# Patient Record
Sex: MALE | Race: OTHER
[De-identification: names, ages, dates, MRNs, and addresses within clinical notes are randomized per-mention and may not be internally consistent; named-entity substitution may affect disease eponyms.]

---

## 2022-11-21 ENCOUNTER — HOSPITAL ENCOUNTER (INPATIENT)
Dept: HOSPITAL 54 - ER | Age: 72
LOS: 6 days | Discharge: HOME HEALTH | DRG: 393 | End: 2022-11-27
Attending: NURSE PRACTITIONER | Admitting: NURSE PRACTITIONER
Payer: MEDICARE

## 2022-11-21 VITALS — WEIGHT: 108 LBS | BODY MASS INDEX: 14.63 KG/M2 | HEIGHT: 72 IN

## 2022-11-21 DIAGNOSIS — K29.70: ICD-10-CM
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
## 2022-11-21 DIAGNOSIS — Z20.822: ICD-10-CM

## 2022-11-21 DIAGNOSIS — Z85.038: ICD-10-CM
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
## 2022-11-21 DIAGNOSIS — Z93.3: ICD-10-CM

## 2022-11-21 DIAGNOSIS — R13.10: ICD-10-CM

## 2022-11-21 DIAGNOSIS — I10: ICD-10-CM

## 2022-11-21 DIAGNOSIS — K94.23: Primary | ICD-10-CM

## 2022-11-21 DIAGNOSIS — G89.29: ICD-10-CM

## 2022-11-21 DIAGNOSIS — L89.153: ICD-10-CM
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
## 2022-11-21 DIAGNOSIS — E87.1: ICD-10-CM

## 2022-11-21 DIAGNOSIS — I48.0: ICD-10-CM

## 2022-11-21 DIAGNOSIS — D63.8: ICD-10-CM

## 2022-11-21 LAB
BASOPHILS # BLD AUTO: 0 K/UL (ref 0–0.2)
BASOPHILS NFR BLD AUTO: 0.5 % (ref 0–2)
BUN SERPL-MCNC: 33 MG/DL (ref 7–18)
CALCIUM SERPL-MCNC: 8.3 MG/DL (ref 8.5–10.1)
CHLORIDE SERPL-SCNC: 101 MMOL/L (ref 98–107)
CO2 SERPL-SCNC: 32 MMOL/L (ref 21–32)
CREAT SERPL-MCNC: 0.9 MG/DL (ref 0.6–1.3)
EOSINOPHIL NFR BLD AUTO: 2.5 % (ref 0–6)
GLUCOSE SERPL-MCNC: 95 MG/DL (ref 74–106)
HCT VFR BLD AUTO: 31 % (ref 39–51)
HGB BLD-MCNC: 10.1 G/DL (ref 13.5–17.5)
LYMPHOCYTES NFR BLD AUTO: 0.8 K/UL (ref 0.8–4.8)
LYMPHOCYTES NFR BLD AUTO: 8.1 % (ref 20–44)
MCHC RBC AUTO-ENTMCNC: 33 G/DL (ref 31–36)
MCV RBC AUTO: 92 FL (ref 80–96)
MONOCYTES NFR BLD AUTO: 0.8 K/UL (ref 0.1–1.3)
MONOCYTES NFR BLD AUTO: 7.9 % (ref 2–12)
NEUTROPHILS # BLD AUTO: 7.8 K/UL (ref 1.8–8.9)
NEUTROPHILS NFR BLD AUTO: 81 % (ref 43–81)
PLATELET # BLD AUTO: 357 K/UL (ref 150–450)
POTASSIUM SERPL-SCNC: 3.9 MMOL/L (ref 3.5–5.1)
RBC # BLD AUTO: 3.35 MIL/UL (ref 4.5–6)
SODIUM SERPL-SCNC: 138 MMOL/L (ref 136–145)
WBC NRBC COR # BLD AUTO: 9.7 K/UL (ref 4.3–11)

## 2022-11-21 PROCEDURE — A9563 P32 NA PHOSPHATE: HCPCS

## 2022-11-21 PROCEDURE — C9803 HOPD COVID-19 SPEC COLLECT: HCPCS

## 2022-11-21 PROCEDURE — A4216 STERILE WATER/SALINE, 10 ML: HCPCS

## 2022-11-21 PROCEDURE — G0378 HOSPITAL OBSERVATION PER HR: HCPCS

## 2022-11-21 PROCEDURE — C9113 INJ PANTOPRAZOLE SODIUM, VIA: HCPCS

## 2022-11-21 NOTE — NUR
TANVI 881 FROM [facility] FOR PULLED OUT GT WITH CLOSED OSTOMY. PATIENT ALERT AND 
ORIENTED X3. AMBULATORY WITH OSTOMY BAG AND HARRINGTON IN PLACE. PATIENT IN BED 11 
ON MONITOR AND POX, AWAITING MD BAE.

## 2022-11-22 VITALS — DIASTOLIC BLOOD PRESSURE: 63 MMHG | SYSTOLIC BLOOD PRESSURE: 115 MMHG

## 2022-11-22 VITALS — SYSTOLIC BLOOD PRESSURE: 109 MMHG | DIASTOLIC BLOOD PRESSURE: 57 MMHG

## 2022-11-22 VITALS — DIASTOLIC BLOOD PRESSURE: 66 MMHG | SYSTOLIC BLOOD PRESSURE: 116 MMHG

## 2022-11-22 LAB
BASOPHILS # BLD AUTO: 0 K/UL (ref 0–0.2)
BASOPHILS NFR BLD AUTO: 0.2 % (ref 0–2)
BUN SERPL-MCNC: 31 MG/DL (ref 7–18)
CALCIUM SERPL-MCNC: 8.2 MG/DL (ref 8.5–10.1)
CHLORIDE SERPL-SCNC: 103 MMOL/L (ref 98–107)
CO2 SERPL-SCNC: 32 MMOL/L (ref 21–32)
CREAT SERPL-MCNC: 0.8 MG/DL (ref 0.6–1.3)
EOSINOPHIL NFR BLD AUTO: 3 % (ref 0–6)
GLUCOSE SERPL-MCNC: 92 MG/DL (ref 74–106)
HCT VFR BLD AUTO: 30 % (ref 39–51)
HGB BLD-MCNC: 10 G/DL (ref 13.5–17.5)
LYMPHOCYTES NFR BLD AUTO: 0.8 K/UL (ref 0.8–4.8)
LYMPHOCYTES NFR BLD AUTO: 10.7 % (ref 20–44)
MAGNESIUM SERPL-MCNC: 1.8 MG/DL (ref 1.8–2.4)
MCHC RBC AUTO-ENTMCNC: 33 G/DL (ref 31–36)
MCV RBC AUTO: 92 FL (ref 80–96)
MONOCYTES NFR BLD AUTO: 0.7 K/UL (ref 0.1–1.3)
MONOCYTES NFR BLD AUTO: 8.8 % (ref 2–12)
NEUTROPHILS # BLD AUTO: 6 K/UL (ref 1.8–8.9)
NEUTROPHILS NFR BLD AUTO: 77.3 % (ref 43–81)
PHOSPHATE SERPL-MCNC: 4.5 MG/DL (ref 2.5–4.9)
PLATELET # BLD AUTO: 372 K/UL (ref 150–450)
POTASSIUM SERPL-SCNC: 3.9 MMOL/L (ref 3.5–5.1)
RBC # BLD AUTO: 3.29 MIL/UL (ref 4.5–6)
SODIUM SERPL-SCNC: 140 MMOL/L (ref 136–145)
WBC NRBC COR # BLD AUTO: 7.8 K/UL (ref 4.3–11)

## 2022-11-22 RX ADMIN — POTASSIUM CHLORIDE SCH MLS/HR: 200 INJECTION, SOLUTION INTRAVENOUS at 14:10

## 2022-11-22 RX ADMIN — SODIUM CHLORIDE SCH MG: 9 INJECTION, SOLUTION INTRAVENOUS at 09:31

## 2022-11-22 RX ADMIN — HYDROMORPHONE HYDROCHLORIDE PRN MG: 2 TABLET ORAL at 14:00

## 2022-11-22 RX ADMIN — POTASSIUM CHLORIDE SCH MLS/HR: 200 INJECTION, SOLUTION INTRAVENOUS at 14:11

## 2022-11-22 NOTE — NUR
LVN/MED RECON



UNABLE TO UPDATE HOME MEDICATION INFORMATION AT THIS TIME. SON WILL PROVIDE INFO LATER. AS 
PER PATIENT AND SON-IDA REQUESTED, TPN INFO OBTAINED FROM  [facility] [phone]. 
INFORMATION SENT TO THE PHARMACY. PRIMARY RN AWARE.

## 2022-11-22 NOTE — NUR
RN ADMISSION NOTES



RECEIVED PATIENT VIA GURNEY FROM ER ACCOMPANIED BY ED STAFF. BREATHING ON ROOM AIR, EVEN AND 
UNLABORED. NO SIGNS OF RESPIRATORY DISTRESS. PICC LINE AT LEFT UPPER ARM FLUSHING WELL. 
ORIENTED TO ROOM SET-UP. BELONGINGS ITEMIZED AND DOCUMENTED. SKIN ASSESSMENT DONE AND 
PHOTOGRAPHED. SAFETY PRECAUTIONS INITIATED, SIDE RAILS UP X2, BED LOWERED AND LOCKED. CALL 
LIGHT WITHIN REACH. WILL CONTINUE TO MONITOR

## 2022-11-22 NOTE — NUR
RN OPENING NOTE;



RECEIVED PATIENT IN BED AA/O X 4.SREEDHAR WELL ON ROOM AIR,NO SIGN SOB/DISTRESS NOTED, EVEN AND 
UNLABORED. PICC LINE AT LEFT UPPER ARM FLUSHING WELL, WITH ONGOING TPN AT 80ML/HR.FC INPLACE 
DRAINING EZIO URINE WITH NO ODOR,SAFETY PRECAUTIONS INITIATED, SIDE RAILS UP X2, BED 
LOWERED AND LOCKED. CALL LIGHT WITHIN REACH.WILL CONTINUE TO MONITOR.

## 2022-11-22 NOTE — NUR
RN CLOSING NOTE



PATIENT A/O X 4. BREATHING ON ROOM AIR, EVEN AND UNLABORED. NO SIGNS OF RESPIRATORY 
DISTRESS. PICC LINE AT LEFT UPPER ARM FLUSHING WELL, WITH ONGOING TPN AT 80ML/HR.  ALL DUE 
MEDS GIVEN. SON VISITED THE PATIENT AROUND 1600h. PHARMACY CALLED TO BRING HOME MEDICATIONS 
AT THE UNIT, PATIENT MADE AWARE TO INFORM FAMILY TO BRING HOME MEDS. SAFETY PRECAUTIONS 
INITIATED, SIDE RAILS UP X2, BED LOWERED AND LOCKED. CALL LIGHT WITHIN REACH. ENDORSED TO 
INCOMING NOD.

## 2022-11-23 VITALS — DIASTOLIC BLOOD PRESSURE: 49 MMHG | SYSTOLIC BLOOD PRESSURE: 141 MMHG

## 2022-11-23 VITALS — SYSTOLIC BLOOD PRESSURE: 106 MMHG | DIASTOLIC BLOOD PRESSURE: 60 MMHG

## 2022-11-23 VITALS — DIASTOLIC BLOOD PRESSURE: 72 MMHG | SYSTOLIC BLOOD PRESSURE: 125 MMHG

## 2022-11-23 LAB
BUN SERPL-MCNC: 27 MG/DL (ref 7–18)
CALCIUM SERPL-MCNC: 8.3 MG/DL (ref 8.5–10.1)
CHLORIDE SERPL-SCNC: 103 MMOL/L (ref 98–107)
CHOLEST SERPL-MCNC: 130 MG/DL (ref ?–200)
CO2 SERPL-SCNC: 25 MMOL/L (ref 21–32)
CREAT SERPL-MCNC: 0.8 MG/DL (ref 0.6–1.3)
GLUCOSE SERPL-MCNC: 115 MG/DL (ref 74–106)
HDLC SERPL-MCNC: 42 MG/DL (ref 40–60)
LDLC SERPL DIRECT ASSAY-MCNC: 84 MG/DL (ref 0–99)
MAGNESIUM SERPL-MCNC: 2.3 MG/DL (ref 1.8–2.4)
PHOSPHATE SERPL-MCNC: 4.3 MG/DL (ref 2.5–4.9)
POTASSIUM SERPL-SCNC: 4.1 MMOL/L (ref 3.5–5.1)
SODIUM SERPL-SCNC: 135 MMOL/L (ref 136–145)
TRIGL SERPL-MCNC: 56 MG/DL (ref 30–150)

## 2022-11-23 RX ADMIN — Medication SCH EACH: at 16:30

## 2022-11-23 RX ADMIN — THERA TABS SCH UDTAB: TAB at 13:00

## 2022-11-23 RX ADMIN — Medication SCH MLS/HR: at 15:14

## 2022-11-23 RX ADMIN — HYDROMORPHONE HYDROCHLORIDE PRN MG: 2 TABLET ORAL at 14:16

## 2022-11-23 RX ADMIN — TAMSULOSIN HYDROCHLORIDE SCH MG: 0.4 CAPSULE ORAL at 13:00

## 2022-11-23 RX ADMIN — FOLIC ACID SCH MG: 1 TABLET ORAL at 13:00

## 2022-11-23 RX ADMIN — Medication SCH OZ: at 20:30

## 2022-11-23 RX ADMIN — GABAPENTIN SCH MG: 100 CAPSULE ORAL at 16:30

## 2022-11-23 RX ADMIN — DULOXETINE HYDROCHLORIDE SCH MG: 30 CAPSULE, DELAYED RELEASE ORAL at 13:00

## 2022-11-23 RX ADMIN — SODIUM CHLORIDE SCH MG: 9 INJECTION, SOLUTION INTRAVENOUS at 08:18

## 2022-11-23 RX ADMIN — HYDROMORPHONE HYDROCHLORIDE PRN MG: 2 TABLET ORAL at 08:16

## 2022-11-23 RX ADMIN — INSULIN HUMAN PRN UNITS: 100 INJECTION, SOLUTION PARENTERAL at 12:26

## 2022-11-23 RX ADMIN — Medication SCH EACH: at 12:23

## 2022-11-23 RX ADMIN — GABAPENTIN SCH MG: 100 CAPSULE ORAL at 12:47

## 2022-11-23 RX ADMIN — Medication SCH EACH: at 17:29

## 2022-11-23 RX ADMIN — HYDROMORPHONE HYDROCHLORIDE PRN MG: 2 TABLET ORAL at 00:34

## 2022-11-23 RX ADMIN — Medication SCH OZ: at 11:00

## 2022-11-23 NOTE — NUR
MS RN NOTES:



PER PHARMACY, RN ASKED PT TO ASK FAMILY FOR HOME MEDS NOT OF FORMULARY, PT VERBALIZED 
UNDERSTANDING. Gabapentin Counseling: I discussed with the patient the risks of gabapentin including but not limited to dizziness, somnolence, fatigue and ataxia.

## 2022-11-23 NOTE — NUR
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 
POUCH AND CRUSTED (CLOSED) PREVIOUS G TUBE SITE, ALL PRESENT ON ADMISSION. DR SAMSON 
CALLED FOR SURGICAL CONSULT. DISCUSSED SKIN PROTECTION WITH NURSING STAFF. MD IN AGREEMENT 
WITH PLAN OF CARE. 

-------------------------------------------------------------------------------

Addendum: 11/23/22 at 1046 by CHADD ROMANO WNDNU

-------------------------------------------------------------------------------

Amended: Links added.

## 2022-11-23 NOTE — NUR
MS RN OPENING NOTES: 



RECEIVED PATIENT IN BED AWAKE, A/O X 4. ON RA, TOLERATING WELL, NO S/S OF SOB/DISTRESS 
NOTED, BREATHING EVEN AND UNLABORED. PICC LINE AT LEFT UPPER ARM FLUSHING WELL, WITH ONGOING 
TPN AT 80ML/HR. FC NOTED, DRAINING EZIO URINE. PT C/O OF PAIN 8/10, WILL MEDICATE PER MD 
ORDER.  SAFETY PRECAUTIONS INITIATED, SIDE RAILS UP X2, BED LOWERED AND LOCKED. CALL LIGHT 
WITHIN REACH, WILL CONTINUE WITH PLAN OF CARE DURING SHIFT.

## 2022-11-23 NOTE — NUR
MS RN CLOSING NOTES:



PATIENT IN BED AWAKE, A/O X 4. ON RA, TOLERATING WELL, NO S/S OF SOB/DISTRESS NOTED, 
BREATHING EVEN AND UNLABORED. PICC LINE AT LEFT UPPER ARM FLUSHING WELL, WITH ONGOING TPN AT 
50ML/HR AND LIPIDS 20ML/HR.  FC NOTED, DRAINED DARK YELLOW URINE, 1200CC DURING SHIFT. 
COLOSTOMY BAG NOTED L UPPER QUADRANT, DRAINED 200CC SOFT DRAINAGE DURING SHIFT. KEPT CLEAN, 
DRY AND COMFORTABLE, NEEDS MET, DENIES PAIN AT THIS TIME.  SAFETY PRECAUTIONS INITIATED, 
SIDE RAILS UP X2, BED LOWERED AND LOCKED. CALL LIGHT WITHIN REACH, WILL ENDORSE TO PM SHIFT.

## 2022-11-23 NOTE — NUR
MS RN NOTES

RECEIVED RESTING COMFORTABLY ON BED.BREATHING EASY,NO SOB.WITH EBONI PICC LINE,INFUSING TPN AT 
50ML/HR RATE,LIPID AT 20ML/HR RATE,SITE PATENT.DVT PUMP IN USED FOR DVT PROPHYLAXIS.NPO 
STATUS,GOING FOR PEG PLACEMENT TOMORROW.CONSENT ON CHART.DENIES DISCOMFORTS AT THE 
MOMENT.CALL LIGHT IN REACH.WILL CONTINUE TO MONITOR STATUS.

## 2022-11-24 VITALS — SYSTOLIC BLOOD PRESSURE: 113 MMHG | DIASTOLIC BLOOD PRESSURE: 73 MMHG

## 2022-11-24 VITALS — DIASTOLIC BLOOD PRESSURE: 59 MMHG | SYSTOLIC BLOOD PRESSURE: 108 MMHG

## 2022-11-24 VITALS — SYSTOLIC BLOOD PRESSURE: 108 MMHG | DIASTOLIC BLOOD PRESSURE: 59 MMHG

## 2022-11-24 VITALS — SYSTOLIC BLOOD PRESSURE: 120 MMHG | DIASTOLIC BLOOD PRESSURE: 58 MMHG

## 2022-11-24 LAB
ALBUMIN SERPL BCP-MCNC: 2.3 G/DL (ref 3.4–5)
BASOPHILS # BLD AUTO: 0 K/UL (ref 0–0.2)
BASOPHILS NFR BLD AUTO: 0.4 % (ref 0–2)
BUN SERPL-MCNC: 26 MG/DL (ref 7–18)
CALCIUM SERPL-MCNC: 8.3 MG/DL (ref 8.5–10.1)
CHLORIDE SERPL-SCNC: 102 MMOL/L (ref 98–107)
CO2 SERPL-SCNC: 25 MMOL/L (ref 21–32)
CREAT SERPL-MCNC: 0.9 MG/DL (ref 0.6–1.3)
EOSINOPHIL NFR BLD AUTO: 2.5 % (ref 0–6)
GLUCOSE SERPL-MCNC: 103 MG/DL (ref 74–106)
HCT VFR BLD AUTO: 36 % (ref 39–51)
HGB BLD-MCNC: 11.4 G/DL (ref 13.5–17.5)
LYMPHOCYTES NFR BLD AUTO: 0.9 K/UL (ref 0.8–4.8)
LYMPHOCYTES NFR BLD AUTO: 8.1 % (ref 20–44)
MAGNESIUM SERPL-MCNC: 1.8 MG/DL (ref 1.8–2.4)
MCHC RBC AUTO-ENTMCNC: 32 G/DL (ref 31–36)
MCV RBC AUTO: 95 FL (ref 80–96)
MONOCYTES NFR BLD AUTO: 0.8 K/UL (ref 0.1–1.3)
MONOCYTES NFR BLD AUTO: 7.9 % (ref 2–12)
NEUTROPHILS # BLD AUTO: 8.7 K/UL (ref 1.8–8.9)
NEUTROPHILS NFR BLD AUTO: 81.1 % (ref 43–81)
PHOSPHATE SERPL-MCNC: 4.2 MG/DL (ref 2.5–4.9)
PLATELET # BLD AUTO: 380 K/UL (ref 150–450)
POTASSIUM SERPL-SCNC: 3.5 MMOL/L (ref 3.5–5.1)
PREALB SERPL-MCNC: 17.7 MG/DL (ref 18–35.7)
RBC # BLD AUTO: 3.78 MIL/UL (ref 4.5–6)
SODIUM SERPL-SCNC: 135 MMOL/L (ref 136–145)
WBC NRBC COR # BLD AUTO: 10.7 K/UL (ref 4.3–11)

## 2022-11-24 PROCEDURE — 0DH63UZ INSERTION OF FEEDING DEVICE INTO STOMACH, PERCUTANEOUS APPROACH: ICD-10-PCS | Performed by: INTERNAL MEDICINE

## 2022-11-24 RX ADMIN — GABAPENTIN SCH MG: 100 CAPSULE ORAL at 18:07

## 2022-11-24 RX ADMIN — THERA TABS SCH UDTAB: TAB at 09:00

## 2022-11-24 RX ADMIN — FOLIC ACID SCH MG: 1 TABLET ORAL at 09:00

## 2022-11-24 RX ADMIN — HYDROMORPHONE HYDROCHLORIDE PRN MG: 2 TABLET ORAL at 21:32

## 2022-11-24 RX ADMIN — DEXTROSE AND SODIUM CHLORIDE PRN MLS/HR: 5; 900 INJECTION, SOLUTION INTRAVENOUS at 20:47

## 2022-11-24 RX ADMIN — Medication SCH EA: at 18:07

## 2022-11-24 RX ADMIN — Medication SCH EACH: at 00:30

## 2022-11-24 RX ADMIN — Medication SCH EACH: at 12:22

## 2022-11-24 RX ADMIN — Medication SCH OZ: at 09:00

## 2022-11-24 RX ADMIN — DULOXETINE HYDROCHLORIDE SCH MG: 30 CAPSULE, DELAYED RELEASE ORAL at 09:00

## 2022-11-24 RX ADMIN — GABAPENTIN SCH MG: 100 CAPSULE ORAL at 13:00

## 2022-11-24 RX ADMIN — HYDROMORPHONE HYDROCHLORIDE PRN MG: 2 TABLET ORAL at 15:52

## 2022-11-24 RX ADMIN — Medication SCH EACH: at 18:07

## 2022-11-24 RX ADMIN — Medication SCH EACH: at 09:00

## 2022-11-24 RX ADMIN — Medication SCH EACH: at 06:07

## 2022-11-24 RX ADMIN — TAMSULOSIN HYDROCHLORIDE SCH MG: 0.4 CAPSULE ORAL at 09:00

## 2022-11-24 RX ADMIN — GABAPENTIN SCH MG: 100 CAPSULE ORAL at 09:00

## 2022-11-24 RX ADMIN — PANTOPRAZOLE SODIUM SCH MG: 40 GRANULE, DELAYED RELEASE ORAL at 09:00

## 2022-11-24 NOTE — NUR
ms rn

breakfast served,due meds given, tolerated well.

-------------------------------------------------------------------------------

Addendum: 11/24/22 at 1525 by SAMUEL MUÑOZ RN

-------------------------------------------------------------------------------

wrong entry

## 2022-11-24 NOTE — NUR
MS RN NOTES

C/O SEVERE PAIN 9/10 ON PAIN SCALE.MEDICATED WITH DILAUDID 4MG PO WITH SIPS OF WATER,WITH 
ORDER.

## 2022-11-24 NOTE — NUR
ms rn

discharge instructions given and understood, patient went home w/ prescription,all needs 
attended.

-------------------------------------------------------------------------------

Addendum: 11/24/22 at 1620 by SAMUEL MUÑOZ RN

-------------------------------------------------------------------------------

wrong pt entry, disregard notes.

## 2022-11-24 NOTE — NUR
ms rn

patient came back from recovery, awake,alert,oriented x4,not in any form of distress, 
surgical site intact w/ no active bleeding at this time, patient denies pain.

## 2022-11-24 NOTE — NUR
ms rn

patient's sons came to visit him, but patient is still sleeping, home meds greceived from 
sons, sent to pharmacy.

## 2022-11-24 NOTE — NUR
MS RN

RECEIVED ON BED, AWAKE,ALERT,ORIENTED X4,NOT IN ANY FORM OF DISTRESS, RESPIRATIONS EVEN AND 
UNLABORED,NO SOB NOTED, CAME FOR GT PLACEMENT, COLOSTOMY INTACT,WENT DOWN FOR PEG 
PLACEMENT,ALL NEEDS ATTENDED.

## 2022-11-24 NOTE — NUR
ms campbell

was seen by dr. serene betancur/ order to go home today.

-------------------------------------------------------------------------------

Addendum: 11/24/22 at 1523 by SAMUEL MUÑOZ RN

-------------------------------------------------------------------------------

disregard notes,wrong pt entry

## 2022-11-24 NOTE — NUR
MS RN CLOSING NOTE

PATIENT IN BED; AWAKE, A/O X 3-4. BREATHING EASY, NO SOB. WITH EBONI PICC LINE INFUSING WITH 
TPN AT 50ML/HR RATE, LIPID AT 20ML/HR; SITE IS PATENT. DVT PUMP ON FOR DVT PROPHYLAXIS.  ON 
NPO STATUS GOING FOR PEG PLACEMENT TOMORROW. WITH CONSENT PLACED ON CHART. DENIES ANY PAIN 
OR DISCOMFORT AT THIS TIME. SAFETY MEASURES IMPLEMENTED: CALL LIGHT AND TABLE WITHIN REACH, 
SIDE RAILS UP X 2, BED IN LOWEST LOCKED POSITION. ENDORSED TO MORNING SHIFT FOR ANGIE.

## 2022-11-24 NOTE — NUR
MS RN NOTES

RECEIVED ON BED,ON HIGH FOWLERS POSITION,BREATHING REGULAR,NOT IN ANY FORM DISTRESS.S/P  PEG 
PLACEMENT FOR DECOMPRESSION.COLOSTOMY BAG IN PLACE,NO OUTPUT ATE MOMENT.GT CLAMPED.HARRINGTON 
CATH IN PLACE DRAINS YELLOWISH OUTPUT.ON TPN #4,INFUSING AT 50ML/HR RATE ON LEFT UPPER ARM 
PICC LINE.DVT PUMP IN USED FOR DVT PROPHYLAXIS.CALL LIGHT IN REACH.WILL CONTINUE TO MONITOR 
STATUS.

## 2022-11-25 VITALS — DIASTOLIC BLOOD PRESSURE: 61 MMHG | SYSTOLIC BLOOD PRESSURE: 110 MMHG

## 2022-11-25 VITALS — SYSTOLIC BLOOD PRESSURE: 115 MMHG | DIASTOLIC BLOOD PRESSURE: 60 MMHG

## 2022-11-25 VITALS — DIASTOLIC BLOOD PRESSURE: 67 MMHG | SYSTOLIC BLOOD PRESSURE: 112 MMHG

## 2022-11-25 LAB
ALBUMIN SERPL BCP-MCNC: 2.4 G/DL (ref 3.4–5)
ALBUMIN SERPL BCP-MCNC: 2.4 G/DL (ref 3.4–5)
ALP SERPL-CCNC: 89 U/L (ref 46–116)
ALT SERPL W P-5'-P-CCNC: 33 U/L (ref 12–78)
AST SERPL W P-5'-P-CCNC: 24 U/L (ref 15–37)
BASOPHILS # BLD AUTO: 0.1 K/UL (ref 0–0.2)
BASOPHILS NFR BLD AUTO: 1.3 % (ref 0–2)
BILIRUB SERPL-MCNC: 0.4 MG/DL (ref 0.2–1)
BUN SERPL-MCNC: 24 MG/DL (ref 7–18)
CALCIUM SERPL-MCNC: 8.8 MG/DL (ref 8.5–10.1)
CHLORIDE SERPL-SCNC: 103 MMOL/L (ref 98–107)
CHOLEST SERPL-MCNC: 125 MG/DL (ref ?–200)
CO2 SERPL-SCNC: 26 MMOL/L (ref 21–32)
CREAT SERPL-MCNC: 0.8 MG/DL (ref 0.6–1.3)
EOSINOPHIL NFR BLD AUTO: 2.4 % (ref 0–6)
GLUCOSE SERPL-MCNC: 109 MG/DL (ref 74–106)
HCT VFR BLD AUTO: 34 % (ref 39–51)
HDLC SERPL-MCNC: 49 MG/DL (ref 40–60)
HGB BLD-MCNC: 11.1 G/DL (ref 13.5–17.5)
LDLC SERPL DIRECT ASSAY-MCNC: 73 MG/DL (ref 0–99)
LYMPHOCYTES NFR BLD AUTO: 0.8 K/UL (ref 0.8–4.8)
LYMPHOCYTES NFR BLD AUTO: 11 % (ref 20–44)
MAGNESIUM SERPL-MCNC: 1.9 MG/DL (ref 1.8–2.4)
MCHC RBC AUTO-ENTMCNC: 32 G/DL (ref 31–36)
MCV RBC AUTO: 92 FL (ref 80–96)
MONOCYTES NFR BLD AUTO: 0.8 K/UL (ref 0.1–1.3)
MONOCYTES NFR BLD AUTO: 11.7 % (ref 2–12)
NEUTROPHILS # BLD AUTO: 5.2 K/UL (ref 1.8–8.9)
NEUTROPHILS NFR BLD AUTO: 73.6 % (ref 43–81)
PHOSPHATE SERPL-MCNC: 3.8 MG/DL (ref 2.5–4.9)
PLATELET # BLD AUTO: 422 K/UL (ref 150–450)
POTASSIUM SERPL-SCNC: 3.5 MMOL/L (ref 3.5–5.1)
PREALB SERPL-MCNC: 17 MG/DL (ref 18–35.7)
PROT SERPL-MCNC: 6.6 G/DL (ref 6.4–8.2)
RBC # BLD AUTO: 3.74 MIL/UL (ref 4.5–6)
SODIUM SERPL-SCNC: 134 MMOL/L (ref 136–145)
TRIGL SERPL-MCNC: 44 MG/DL (ref 30–150)
WBC NRBC COR # BLD AUTO: 7.1 K/UL (ref 4.3–11)

## 2022-11-25 RX ADMIN — Medication SCH EA: at 18:00

## 2022-11-25 RX ADMIN — HYDROMORPHONE HYDROCHLORIDE PRN MG: 2 TABLET ORAL at 15:08

## 2022-11-25 RX ADMIN — GABAPENTIN SCH MG: 100 CAPSULE ORAL at 12:39

## 2022-11-25 RX ADMIN — Medication SCH EACH: at 00:13

## 2022-11-25 RX ADMIN — Medication SCH EACH: at 17:26

## 2022-11-25 RX ADMIN — THERA TABS SCH UDTAB: TAB at 09:00

## 2022-11-25 RX ADMIN — Medication SCH MLS/HR: at 14:59

## 2022-11-25 RX ADMIN — Medication SCH EACH: at 09:00

## 2022-11-25 RX ADMIN — Medication SCH EA: at 11:51

## 2022-11-25 RX ADMIN — PANTOPRAZOLE SODIUM SCH MG: 40 GRANULE, DELAYED RELEASE ORAL at 09:00

## 2022-11-25 RX ADMIN — Medication SCH EACH: at 16:34

## 2022-11-25 RX ADMIN — GABAPENTIN SCH MG: 100 CAPSULE ORAL at 09:00

## 2022-11-25 RX ADMIN — Medication SCH EA: at 06:11

## 2022-11-25 RX ADMIN — GABAPENTIN SCH MG: 100 CAPSULE ORAL at 16:34

## 2022-11-25 RX ADMIN — DULOXETINE HYDROCHLORIDE SCH MG: 30 CAPSULE, DELAYED RELEASE ORAL at 09:00

## 2022-11-25 RX ADMIN — Medication SCH EACH: at 06:11

## 2022-11-25 RX ADMIN — HYDROMORPHONE HYDROCHLORIDE PRN MG: 2 TABLET ORAL at 21:41

## 2022-11-25 RX ADMIN — TAMSULOSIN HYDROCHLORIDE SCH MG: 0.4 CAPSULE ORAL at 09:00

## 2022-11-25 RX ADMIN — Medication SCH OZ: at 09:31

## 2022-11-25 RX ADMIN — Medication SCH EA: at 00:25

## 2022-11-25 RX ADMIN — FOLIC ACID SCH MG: 1 TABLET ORAL at 09:00

## 2022-11-25 RX ADMIN — Medication SCH EACH: at 11:59

## 2022-11-25 NOTE — NUR
RN NOTES:



OPENED TH PIXYS TWICE, FORGOT TO SCAN THE MEDS. GOT ONLY 1 MEDS FOR PATIENT AND WITNESSED BY 
NATALIA DELGADO.

## 2022-11-25 NOTE — NUR
MS RN OPENING NOTES:



RECEIVED PATIENT IN BED AWAKE, ALERT AND ORIENTED X3-4 . NO SOB OR CARDIAC DISTRESS NOTED ON 
02 INHALATION @2LPM VIA NC AND TOLERATING WELL. DENIES ANY PAIN AT THIS TIME. NOTED WITH IV 
ACCESS ON EDGAR PICC LINE GAUGE 18 PATENT,INTACT AND INFUSING IV FLUIDS WELL D5NS  1L @30ML/HR 
TPN BOTTLE #5 @50ML/HR. NOTED WITH COLOSTOMY BAG. FC DRAINING CLEAR YELLOW COLORED URINE VIA 
GRAVITY,PATENT AND INTACT. SAFETY MEASURES MAINTAINED: BED LOCKED AND IN LOWEST POSITION, 
SIDE RAILS UP X 2 AND CALL LIGHT IN EASY REACH. WILL MONITOR ACCORDINGLY.

## 2022-11-25 NOTE — NUR
MS RN NOTES  ACCU-CHECK

BLOOD SUGAR CHECK 118,NO INSULIN COVERAGE.TPN #5 HUNG,INFUSING AT 50ML/HR RATE VIA IV PUMP 
ON LEFT UPPER ARM PICC LINE.IN NO ACUTE DISTRESS.

## 2022-11-25 NOTE — NUR
noc rn opening



received patient in bed. a/ox4. no s/s of apparent distress on room air. pain tolerable at 
this time per patient. EDGAR PICC LINE running TPN @50mls/hr and Lipids @20mls/hr. colostomy 
in place, stoma red and bag clean, no output for now. Bennett cath draining cloudy brown urine 
with sediments. call light within reach. safety in place. will cont. with the plan for 
patient.

## 2022-11-25 NOTE — NUR
RN NOTES:



PT OUT OF BUPRENORPHRINE HCL 2MG SL ITS HOME MEDS, CALLED PHARMACY AND INSTRUCTED PT'S 
FAMILY NEED TO BRING HOME MEDS, PT MADE AWARE AND WILL BRING MEDS TOMORROW MORNING. CHARGE 
NURSE MADE AWARE.

## 2022-11-25 NOTE — NUR
MS RN CLOSING NOTES:



PATIENT IN BED,AWAKE. ALERT AND ORIENTED X 4 AND ABLE TO VERBALIZED NEEDS. ON O2 INHALATION 
@2LPM VIA NC AND TOLERATING WELL (ON AND OFF). NO SOB OR CARDIAC DISTRESS NOTED. IV ACCESS  
ON EDGAR PICC LINE PATENT INTACT AND INFUSING IV FLUIDS AT 75ML/HR. TPN #6 @50ML/HR, LIPIDS 
@20ML/HR. ON PAIN MANAGEMENT AS ORDERED.  G TUBE FOR DECOMPRESSION PURPOSES. COLOSTOMY 
INTACT.SAFETY MEASURES MAINTAINED: BED LOCKED AND IN LOWEST POSITION. SIDE RAILS UP X 2 CALL 
LIGHT IN EASY R EACH FOR HELP. WILL MONITOR PT ACCORDINGLY. ENDORSED TO NIGHT SHIFT RN FOR 
CONTINUITY OF CARE. .

## 2022-11-26 VITALS — SYSTOLIC BLOOD PRESSURE: 102 MMHG | DIASTOLIC BLOOD PRESSURE: 67 MMHG

## 2022-11-26 VITALS — SYSTOLIC BLOOD PRESSURE: 103 MMHG | DIASTOLIC BLOOD PRESSURE: 62 MMHG

## 2022-11-26 VITALS — SYSTOLIC BLOOD PRESSURE: 114 MMHG | DIASTOLIC BLOOD PRESSURE: 75 MMHG

## 2022-11-26 LAB
ALBUMIN SERPL BCP-MCNC: 2.3 G/DL (ref 3.4–5)
ALP SERPL-CCNC: 83 U/L (ref 46–116)
ALT SERPL W P-5'-P-CCNC: 33 U/L (ref 12–78)
AST SERPL W P-5'-P-CCNC: 27 U/L (ref 15–37)
BILIRUB SERPL-MCNC: 0.3 MG/DL (ref 0.2–1)
BUN SERPL-MCNC: 25 MG/DL (ref 7–18)
CALCIUM SERPL-MCNC: 8.7 MG/DL (ref 8.5–10.1)
CHLORIDE SERPL-SCNC: 103 MMOL/L (ref 98–107)
CHOLEST SERPL-MCNC: 123 MG/DL (ref ?–200)
CO2 SERPL-SCNC: 25 MMOL/L (ref 21–32)
CREAT SERPL-MCNC: 0.8 MG/DL (ref 0.6–1.3)
GLUCOSE SERPL-MCNC: 118 MG/DL (ref 74–106)
HDLC SERPL-MCNC: 42 MG/DL (ref 40–60)
LDLC SERPL DIRECT ASSAY-MCNC: 78 MG/DL (ref 0–99)
MAGNESIUM SERPL-MCNC: 1.7 MG/DL (ref 1.8–2.4)
PHOSPHATE SERPL-MCNC: 3.7 MG/DL (ref 2.5–4.9)
POTASSIUM SERPL-SCNC: 3.4 MMOL/L (ref 3.5–5.1)
PROT SERPL-MCNC: 6.3 G/DL (ref 6.4–8.2)
SODIUM SERPL-SCNC: 134 MMOL/L (ref 136–145)
TRIGL SERPL-MCNC: 82 MG/DL (ref 30–150)

## 2022-11-26 RX ADMIN — PANTOPRAZOLE SODIUM SCH MG: 40 GRANULE, DELAYED RELEASE ORAL at 09:56

## 2022-11-26 RX ADMIN — POTASSIUM CHLORIDE SCH MLS/HR: 200 INJECTION, SOLUTION INTRAVENOUS at 15:03

## 2022-11-26 RX ADMIN — Medication SCH EACH: at 09:56

## 2022-11-26 RX ADMIN — Medication SCH EA: at 06:00

## 2022-11-26 RX ADMIN — HYDROMORPHONE HYDROCHLORIDE PRN MG: 2 TABLET ORAL at 18:40

## 2022-11-26 RX ADMIN — POTASSIUM CHLORIDE SCH MLS/HR: 200 INJECTION, SOLUTION INTRAVENOUS at 16:06

## 2022-11-26 RX ADMIN — DULOXETINE HYDROCHLORIDE SCH MG: 30 CAPSULE, DELAYED RELEASE ORAL at 09:56

## 2022-11-26 RX ADMIN — FOLIC ACID SCH MG: 1 TABLET ORAL at 09:56

## 2022-11-26 RX ADMIN — Medication SCH OZ: at 10:01

## 2022-11-26 RX ADMIN — POTASSIUM CHLORIDE SCH MLS/HR: 200 INJECTION, SOLUTION INTRAVENOUS at 10:46

## 2022-11-26 RX ADMIN — MAGNESIUM SULFATE IN DEXTROSE SCH MLS/HR: 10 INJECTION, SOLUTION INTRAVENOUS at 10:45

## 2022-11-26 RX ADMIN — TAMSULOSIN HYDROCHLORIDE SCH MG: 0.4 CAPSULE ORAL at 09:56

## 2022-11-26 RX ADMIN — Medication SCH EACH: at 12:06

## 2022-11-26 RX ADMIN — Medication SCH EACH: at 17:10

## 2022-11-26 RX ADMIN — Medication SCH EA: at 00:00

## 2022-11-26 RX ADMIN — Medication SCH EACH: at 06:26

## 2022-11-26 RX ADMIN — GABAPENTIN SCH MG: 100 CAPSULE ORAL at 09:56

## 2022-11-26 RX ADMIN — Medication SCH EA: at 18:00

## 2022-11-26 RX ADMIN — POTASSIUM CHLORIDE SCH MLS/HR: 200 INJECTION, SOLUTION INTRAVENOUS at 09:47

## 2022-11-26 RX ADMIN — INSULIN HUMAN PRN UNITS: 100 INJECTION, SOLUTION PARENTERAL at 06:26

## 2022-11-26 RX ADMIN — POTASSIUM CHLORIDE SCH MLS/HR: 200 INJECTION, SOLUTION INTRAVENOUS at 13:01

## 2022-11-26 RX ADMIN — GABAPENTIN SCH MG: 100 CAPSULE ORAL at 12:40

## 2022-11-26 RX ADMIN — GABAPENTIN SCH MG: 100 CAPSULE ORAL at 16:14

## 2022-11-26 RX ADMIN — MAGNESIUM SULFATE IN DEXTROSE SCH MLS/HR: 10 INJECTION, SOLUTION INTRAVENOUS at 09:46

## 2022-11-26 RX ADMIN — POTASSIUM CHLORIDE SCH MLS/HR: 200 INJECTION, SOLUTION INTRAVENOUS at 13:44

## 2022-11-26 RX ADMIN — DEXTROSE AND SODIUM CHLORIDE PRN MLS/HR: 5; 900 INJECTION, SOLUTION INTRAVENOUS at 17:53

## 2022-11-26 RX ADMIN — INSULIN HUMAN PRN UNITS: 100 INJECTION, SOLUTION PARENTERAL at 12:58

## 2022-11-26 RX ADMIN — Medication SCH EACH: at 00:20

## 2022-11-26 RX ADMIN — Medication SCH OZ: at 09:00

## 2022-11-26 RX ADMIN — Medication SCH EA: at 12:00

## 2022-11-26 RX ADMIN — HYDROMORPHONE HYDROCHLORIDE PRN MG: 2 TABLET ORAL at 12:40

## 2022-11-26 RX ADMIN — Medication SCH EACH: at 16:14

## 2022-11-26 RX ADMIN — THERA TABS SCH UDTAB: TAB at 09:56

## 2022-11-26 RX ADMIN — HYDROMORPHONE HYDROCHLORIDE PRN MG: 2 TABLET ORAL at 04:33

## 2022-11-26 NOTE — NUR
noc rn opening



received patient in bed. a/ox4. 2 visitors at bedside. no s/s of apparent distress on room 
air. pain tolerable at this time per patient. EDGAR PICC LINE running TPN @50mls/hr and Lipids 
@20mls/hr. colostomy in place, stoma red and bag clean, no output for now. Bennett cath 
draining cloudy brown urine with sediments. call light within reach. safety in place. will 
cont. with the plan for patient.

## 2022-11-26 NOTE — NUR
noc rn note



all needs attended. all scheduled meds administered. report given to clarisse for continuity 
of care.

## 2022-11-26 NOTE — NUR
rn note- non-admin



non-admin home med Buprenorphine hcl. not on patient's cassette and per endorsement, patient 
states his son will bring the medication tomorrow.

## 2022-11-26 NOTE — NUR
noc rn note- non-admin



non-admin home med Buprenorphine hcl. not on patient's cassette and per endorsement, patient 
family to bring in tomorrow.

## 2022-11-26 NOTE — NUR
MS RN CLOSING NOTES:



PATIENT IN BED AWAKE, ALERT AND ORIENTED X3-4. WITH SPO2 98% ON RA, TOLERATING WELL. NO SOB 
OR CARDIAC DISTRESS NOTED. DENIES ANY PAIN AT THIS TIME. NOTED WITH IV ACCESS ON EDGAR PICC 
LINE GAUGE 18 PATENT,INTACT AND INFUSING IV FLUIDS WELL D5NS  1L @30ML/HR TPN BOTTLE#7 
@50ML/HR. COLOSTOMY BAG CHANGED AND EMPTIED. WOUND CARE DONE FC DRAINING CLEAR YELLOW 
COLORED URINE VIA GRAVITY,PATENT AND INTACT. SAFETY MEASURES MAINTAINED: BED LOCKED AND IN 
LOWEST POSITION, SIDE RAILS UP X 2 AND CALL LIGHT IN EASY REACH. ENDORSED TO INCOMING NURSE.

## 2022-11-26 NOTE — NUR
noc rn note



messaged Doctor Jason to clarify Mag 1 g order for 2100 since patient had received 2 bags 
already in the am. awaiting Doctor's response.

## 2022-11-26 NOTE — NUR
MS RN OPENING NOTES:



RECEIVED PATIENT IN BED AWAKE, ALERT AND ORIENTED X3-4. WITH SPO2 99% ON RA, TOLERATING 
WELL. NO SOB OR CARDIAC DISTRESS NOTED. DENIES ANY PAIN AT THIS TIME. NOTED WITH IV ACCESS 
ON EDGAR PICC LINE GAUGE 18 PATENT,INTACT AND INFUSING IV FLUIDS WELL D5NS  1L @30ML/HR TPN 
BOTTLE @50ML/HR. NOTED WITH COLOSTOMY BAG. FC DRAINING CLEAR YELLOW COLORED URINE VIA 
GRAVITY,PATENT AND INTACT. SAFETY MEASURES MAINTAINED: BED LOCKED AND IN LOWEST POSITION, 
SIDE RAILS UP X 2 AND CALL LIGHT IN EASY REACH. WILL MONITOR ACCORDINGLY.

## 2022-11-27 VITALS — SYSTOLIC BLOOD PRESSURE: 114 MMHG | DIASTOLIC BLOOD PRESSURE: 66 MMHG

## 2022-11-27 LAB
BUN SERPL-MCNC: 23 MG/DL (ref 7–18)
CALCIUM SERPL-MCNC: 8.5 MG/DL (ref 8.5–10.1)
CHLORIDE SERPL-SCNC: 104 MMOL/L (ref 98–107)
CO2 SERPL-SCNC: 26 MMOL/L (ref 21–32)
CREAT SERPL-MCNC: 0.7 MG/DL (ref 0.6–1.3)
GLUCOSE SERPL-MCNC: 102 MG/DL (ref 74–106)
MAGNESIUM SERPL-MCNC: 2 MG/DL (ref 1.8–2.4)
PHOSPHATE SERPL-MCNC: 3.6 MG/DL (ref 2.5–4.9)
POTASSIUM SERPL-SCNC: 3.7 MMOL/L (ref 3.5–5.1)
SODIUM SERPL-SCNC: 137 MMOL/L (ref 136–145)

## 2022-11-27 RX ADMIN — Medication SCH EACH: at 00:31

## 2022-11-27 RX ADMIN — THERA TABS SCH UDTAB: TAB at 08:25

## 2022-11-27 RX ADMIN — INSULIN HUMAN PRN UNITS: 100 INJECTION, SOLUTION PARENTERAL at 06:20

## 2022-11-27 RX ADMIN — Medication SCH EA: at 06:00

## 2022-11-27 RX ADMIN — PANTOPRAZOLE SODIUM SCH MG: 40 GRANULE, DELAYED RELEASE ORAL at 08:25

## 2022-11-27 RX ADMIN — Medication SCH OZ: at 08:32

## 2022-11-27 RX ADMIN — DULOXETINE HYDROCHLORIDE SCH MG: 30 CAPSULE, DELAYED RELEASE ORAL at 08:25

## 2022-11-27 RX ADMIN — GABAPENTIN SCH MG: 100 CAPSULE ORAL at 08:25

## 2022-11-27 RX ADMIN — Medication SCH EA: at 12:00

## 2022-11-27 RX ADMIN — Medication SCH EACH: at 06:19

## 2022-11-27 RX ADMIN — Medication SCH EACH: at 08:25

## 2022-11-27 RX ADMIN — HYDROMORPHONE HYDROCHLORIDE PRN MG: 2 TABLET ORAL at 12:09

## 2022-11-27 RX ADMIN — HYDROMORPHONE HYDROCHLORIDE PRN MG: 2 TABLET ORAL at 00:33

## 2022-11-27 RX ADMIN — Medication SCH EACH: at 11:35

## 2022-11-27 RX ADMIN — Medication SCH OZ: at 08:33

## 2022-11-27 RX ADMIN — TAMSULOSIN HYDROCHLORIDE SCH MG: 0.4 CAPSULE ORAL at 08:26

## 2022-11-27 RX ADMIN — Medication SCH OZ: at 09:40

## 2022-11-27 RX ADMIN — Medication SCH EA: at 00:00

## 2022-11-27 RX ADMIN — FOLIC ACID SCH MG: 1 TABLET ORAL at 08:26

## 2022-11-27 NOTE — NUR
DISCHARGED NOTE



PATIENT DISCHARGED TO HOME IN STABLE CONDITION. A/OX4 ON RA, TOLERATING WELL. NO SOB NOTED. 
VITALS TAKEN, RECORDED AND STABLE. WOUND CARE RENDERED, PICTURES TAKEN. DENIES PAIN OR 
DISCOMFORT AT THIS TIME. ALL BELONGINGS ACCOUNTED TO THE PATIENT INCLUDING HOME MEDICATIONS. 
DISCHARGED INSTRUCTION RELAYED TO PATIENT, PATIENT VERBALIZED UNDERSTANDING. WITH PICC LINE 
ON EDGAR, PEG DRY AND INTACT, HARRINGTON CATHETER DRAINING WELL. PATIENT WILL HAVE HOME CARE HELP 
VIA HOME NURSE VISIT BACK HOME AS STATED BY THE PATIENT. DISCHARGED.

## 2022-11-27 NOTE — NUR
noc rn note



patient adamantly refused to be changed even his colostomy bag. per patient "it doesn't need 
changing yet and regarding the change of linens per patient we changed him last night and 
doesn't need changing again and that it hurts too much to change. Patient dilaudid PRN not 
yet due at this time.

## 2022-11-27 NOTE — NUR
noc rn note



patient c/o 10/10 pain on his incision site. Dilaudid PO given 6 min. early. will reassess.